# Patient Record
Sex: MALE | Race: WHITE | NOT HISPANIC OR LATINO | Employment: OTHER | URBAN - METROPOLITAN AREA
[De-identification: names, ages, dates, MRNs, and addresses within clinical notes are randomized per-mention and may not be internally consistent; named-entity substitution may affect disease eponyms.]

---

## 2022-10-28 ENCOUNTER — HOSPITAL ENCOUNTER (OUTPATIENT)
Facility: MEDICAL CENTER | Age: 77
End: 2022-10-28
Attending: NURSE PRACTITIONER
Payer: COMMERCIAL

## 2022-10-28 ENCOUNTER — OFFICE VISIT (OUTPATIENT)
Dept: URGENT CARE | Facility: CLINIC | Age: 77
End: 2022-10-28
Payer: COMMERCIAL

## 2022-10-28 VITALS
SYSTOLIC BLOOD PRESSURE: 108 MMHG | RESPIRATION RATE: 16 BRPM | HEIGHT: 71 IN | OXYGEN SATURATION: 94 % | WEIGHT: 237 LBS | DIASTOLIC BLOOD PRESSURE: 60 MMHG | TEMPERATURE: 98.4 F | BODY MASS INDEX: 33.18 KG/M2 | HEART RATE: 80 BPM

## 2022-10-28 DIAGNOSIS — Z79.01 CHRONIC ANTICOAGULATION: ICD-10-CM

## 2022-10-28 DIAGNOSIS — L02.415 CELLULITIS AND ABSCESS OF RIGHT LEG: ICD-10-CM

## 2022-10-28 DIAGNOSIS — R60.0 EDEMA LEG: ICD-10-CM

## 2022-10-28 DIAGNOSIS — Z86.14 HISTORY OF METHICILLIN RESISTANT STAPHYLOCOCCUS AUREUS (MRSA): ICD-10-CM

## 2022-10-28 DIAGNOSIS — L03.115 CELLULITIS AND ABSCESS OF RIGHT LEG: ICD-10-CM

## 2022-10-28 PROCEDURE — 99214 OFFICE O/P EST MOD 30 MIN: CPT | Performed by: NURSE PRACTITIONER

## 2022-10-28 RX ORDER — CEPHALEXIN 500 MG/1
500 CAPSULE ORAL 4 TIMES DAILY
Qty: 28 CAPSULE | Refills: 0 | Status: SHIPPED | OUTPATIENT
Start: 2022-10-28 | End: 2022-11-04

## 2022-10-28 RX ORDER — SULFAMETHOXAZOLE AND TRIMETHOPRIM 800; 160 MG/1; MG/1
1 TABLET ORAL 2 TIMES DAILY
Qty: 14 TABLET | Refills: 0 | Status: SHIPPED | OUTPATIENT
Start: 2022-10-28 | End: 2022-11-04

## 2022-10-28 NOTE — PROGRESS NOTES
Subjective:   Grupo Bowen is a 77 y.o. male who presents for Leg Pain (R calf pain, rash, redness, swelling x 10 days )       HPI  Patient presents for evaluation of an approximate 10-day history of right posterior calf pain, redness, swelling, and drainage.  Patient states his symptoms began worse over the past 2 to 3 days.  Per chart review, he was seen in the ED on 9/22 for same issue.  He was given Keflex and Bactrim at that time, patient states that his wound mostly healed.  Patient is unsure how wound initially occurred.  Of note, he takes Eliquis daily for atrial fibrillation.    ROS  All other systems are negative except as documented above within HPI.    MEDS:   Current Outpatient Medications:   •  anastrozole (ARIMIDEX) 1 MG Tab, Take 1 mg by mouth., Disp: , Rfl:   •  mupirocin (BACTROBAN) 2 % Ointment, Apply 1 Application topically 2 times a day., Disp: 22 g, Rfl: 0  •  apixaban (ELIQUIS) 5mg Tab, Take 5 mg by mouth 2 times a day., Disp: , Rfl:   •  lisinopril (PRINIVIL) 20 MG Tab, Take 20 mg by mouth every day., Disp: , Rfl:   •  simvastatin (ZOCOR) 40 MG Tab, Take 40 mg by mouth every evening., Disp: , Rfl:   •  atenolol (TENORMIN) 50 MG Tab, Take 50 mg by mouth every day., Disp: , Rfl:   •  hydroCHLOROthiazide (HYDRODIURIL) 25 MG Tab, Take 25 mg by mouth every day., Disp: , Rfl:   •  allopurinol (ZYLOPRIM) 300 MG Tab, Take 300 mg by mouth every day., Disp: , Rfl:   •  traZODone (DESYREL) 50 MG Tab, Take 50 mg by mouth every evening., Disp: , Rfl:   •  potassium chloride (KLOR-CON) 20 MEQ Pack, Take 20 mEq by mouth 2 times a day., Disp: , Rfl:   •  nitroglycerin (NITRODUR) 0.1 MG/HR PATCH 24 HR, Place 1 Patch on the skin every day. (Patient not taking: Reported on 10/28/2022), Disp: , Rfl:   ALLERGIES: No Known Allergies    Patient's PMH, SocHx, SurgHx, FamHx, Drug allergies and medications were reviewed.     Objective:   /60 (BP Location: Left arm, Patient Position: Sitting, BP Cuff Size:  "Adult)   Pulse 80   Temp 36.9 °C (98.4 °F) (Temporal)   Resp 16   Ht 1.803 m (5' 11\")   Wt 108 kg (237 lb)   SpO2 94%   BMI 33.05 kg/m²     Physical Exam  Vitals and nursing note reviewed.   Constitutional:       General: He is awake.      Appearance: Normal appearance. He is well-developed.   HENT:      Head: Normocephalic and atraumatic.      Right Ear: External ear normal.      Left Ear: External ear normal.      Nose: Nose normal.      Mouth/Throat:      Lips: Pink.      Mouth: Mucous membranes are moist.      Pharynx: Oropharynx is clear.   Eyes:      General: Lids are normal.      Extraocular Movements: Extraocular movements intact.      Conjunctiva/sclera: Conjunctivae normal.   Cardiovascular:      Rate and Rhythm: Normal rate and regular rhythm.   Pulmonary:      Effort: Pulmonary effort is normal.   Musculoskeletal:         General: Normal range of motion.      Cervical back: Normal range of motion.   Skin:     General: Skin is warm and dry.             Comments: 5x4cm wound to right posterior calf 3+ nonpitting edema with skin sloughing, serosanguineous drainage, and surrounding dry, flaky skin.  Bilateral lower extremities erythematic, and firm, consistent with chronic venous insufficiency.   Neurological:      Mental Status: He is alert and oriented to person, place, and time.   Psychiatric:         Mood and Affect: Mood normal.         Behavior: Behavior normal. Behavior is cooperative.         Thought Content: Thought content normal.         Judgment: Judgment normal.       Assessment/Plan:   Assessment    1. Cellulitis and abscess of right leg  - sulfamethoxazole-trimethoprim (BACTRIM DS) 800-160 MG tablet; Take 1 Tablet by mouth 2 times a day for 7 days.  Dispense: 14 Tablet; Refill: 0  - cephALEXin (KEFLEX) 500 MG Cap; Take 1 Capsule by mouth 4 times a day for 7 days.  Dispense: 28 Capsule; Refill: 0  - CULTURE WOUND W/ GRAM STAIN; Future  - CBC WITH DIFFERENTIAL; Future  - Comp Metabolic " Panel; Future    2. History of methicillin resistant staphylococcus aureus (MRSA)  - sulfamethoxazole-trimethoprim (BACTRIM DS) 800-160 MG tablet; Take 1 Tablet by mouth 2 times a day for 7 days.  Dispense: 14 Tablet; Refill: 0  - cephALEXin (KEFLEX) 500 MG Cap; Take 1 Capsule by mouth 4 times a day for 7 days.  Dispense: 28 Capsule; Refill: 0  - CULTURE WOUND W/ GRAM STAIN; Future  - CBC WITH DIFFERENTIAL; Future  - Comp Metabolic Panel; Future    3. Chronic anticoagulation  - CBC WITH DIFFERENTIAL; Future  - Comp Metabolic Panel; Future    4. Edema leg  - CULTURE WOUND W/ GRAM STAIN; Future  - CBC WITH DIFFERENTIAL; Future  - Comp Metabolic Panel; Future  - proBrain Natriuretic Peptide, NT; Future      Vital signs stable at today's acute urgent care visit.  Wound thoroughly cleaned and redressed.  Wound care discussed at length with patient.  Begin medications as listed.  Additionally, referral to wound care.  Labs as listed, patient will complete today after urgent care visit.      Advised the patient to follow-up with the primary care provider/urgent care if symptoms persist.  Red flags discussed and indications to immediately call 911 or present to the ED. All questions were encouraged and answered to the patient's satisfaction and understanding, and they agree to the plan of care.     This is an acute problem with uncertain prognosis, medication management and instructions as well as management options were provided.  I personally reviewed prior external notes and test results pertinent to today and independently reviewed and interpreted all diagnostics. Time spent evaluating this patient includes preparing for visit, counseling/education, exam, evaluation, obtaining history, and ordering lab/test/procedures.      Please note that this dictation was created using voice recognition software. I have made a reasonable attempt to correct obvious errors, but I expect that there are errors of grammar and possibly  content that I did not discover before finalizing the note.

## 2022-10-29 ENCOUNTER — TELEPHONE (OUTPATIENT)
Dept: URGENT CARE | Facility: CLINIC | Age: 77
End: 2022-10-29
Payer: COMMERCIAL

## 2022-10-29 DIAGNOSIS — L02.415 CELLULITIS AND ABSCESS OF RIGHT LEG: ICD-10-CM

## 2022-10-29 DIAGNOSIS — Z86.14 HISTORY OF METHICILLIN RESISTANT STAPHYLOCOCCUS AUREUS (MRSA): ICD-10-CM

## 2022-10-29 DIAGNOSIS — L03.115 CELLULITIS AND ABSCESS OF RIGHT LEG: ICD-10-CM

## 2022-10-29 DIAGNOSIS — R60.0 EDEMA LEG: ICD-10-CM

## 2022-10-29 NOTE — TELEPHONE ENCOUNTER
Lab called in regards to a recollect of wound culture.  never showed 10/29/22. Specimen valid for 24 hours. Attempted to call pt, no answer. Will attempt 10/30/22.

## 2022-10-31 ENCOUNTER — OFFICE VISIT (OUTPATIENT)
Dept: URGENT CARE | Facility: CLINIC | Age: 77
End: 2022-10-31
Payer: COMMERCIAL

## 2022-10-31 VITALS
RESPIRATION RATE: 16 BRPM | DIASTOLIC BLOOD PRESSURE: 84 MMHG | BODY MASS INDEX: 34.97 KG/M2 | HEIGHT: 71 IN | SYSTOLIC BLOOD PRESSURE: 122 MMHG | TEMPERATURE: 98.6 F | OXYGEN SATURATION: 98 % | WEIGHT: 249.8 LBS | HEART RATE: 95 BPM

## 2022-10-31 DIAGNOSIS — L03.115 CELLULITIS AND ABSCESS OF RIGHT LEG: ICD-10-CM

## 2022-10-31 DIAGNOSIS — Z86.14 HISTORY OF METHICILLIN RESISTANT STAPHYLOCOCCUS AUREUS (MRSA): ICD-10-CM

## 2022-10-31 DIAGNOSIS — Z79.01 CHRONIC ANTICOAGULATION: ICD-10-CM

## 2022-10-31 DIAGNOSIS — N50.89 TESTICULAR SWELLING: ICD-10-CM

## 2022-10-31 DIAGNOSIS — R60.0 EDEMA LEG: ICD-10-CM

## 2022-10-31 DIAGNOSIS — L02.415 CELLULITIS AND ABSCESS OF RIGHT LEG: ICD-10-CM

## 2022-10-31 PROCEDURE — 99214 OFFICE O/P EST MOD 30 MIN: CPT | Performed by: NURSE PRACTITIONER

## 2022-10-31 NOTE — PROGRESS NOTES
Subjective:   Grupo Bowen is a 77 y.o. male who presents for Possible Hernia (Testicle swelling)       HPI  Patient presents for evaluation of an approximate 1 week history of testicular swelling.  Patient states he had similar symptoms in August, which had improved on their own.  Patient is concerned due to swelling severe enough that he needed to push his penis out to urinate.    Additionally, patient is here for wound check involving his right posterior calf.  Patient was seen by myself on 10/28, prescribed Bactrim.  Wound culture was attempted, however was not received by lab and appropriate timing, therefore canceled.  He has a history of MRSA.  Patient has been taking Bactrim as prescribed as well as dressing changes, however is concerned due to worsening swelling and drainage.  Denies fever or chills.    ROS  All other systems are negative except as documented above within HPI.    MEDS:   Current Outpatient Medications:     sulfamethoxazole-trimethoprim (BACTRIM DS) 800-160 MG tablet, Take 1 Tablet by mouth 2 times a day for 7 days., Disp: 14 Tablet, Rfl: 0    cephALEXin (KEFLEX) 500 MG Cap, Take 1 Capsule by mouth 4 times a day for 7 days., Disp: 28 Capsule, Rfl: 0    anastrozole (ARIMIDEX) 1 MG Tab, Take 1 mg by mouth., Disp: , Rfl:     mupirocin (BACTROBAN) 2 % Ointment, Apply 1 Application topically 2 times a day., Disp: 22 g, Rfl: 0    apixaban (ELIQUIS) 5mg Tab, Take 5 mg by mouth 2 times a day., Disp: , Rfl:     lisinopril (PRINIVIL) 20 MG Tab, Take 20 mg by mouth every day., Disp: , Rfl:     simvastatin (ZOCOR) 40 MG Tab, Take 40 mg by mouth every evening., Disp: , Rfl:     atenolol (TENORMIN) 50 MG Tab, Take 50 mg by mouth every day., Disp: , Rfl:     hydroCHLOROthiazide (HYDRODIURIL) 25 MG Tab, Take 25 mg by mouth every day., Disp: , Rfl:     allopurinol (ZYLOPRIM) 300 MG Tab, Take 300 mg by mouth every day., Disp: , Rfl:     traZODone (DESYREL) 50 MG Tab, Take 50 mg by mouth every evening.,  "Disp: , Rfl:     potassium chloride (KLOR-CON) 20 MEQ Pack, Take 20 mEq by mouth 2 times a day., Disp: , Rfl:     nitroglycerin (NITRODUR) 0.1 MG/HR PATCH 24 HR, Place 1 Patch on the skin every day. (Patient not taking: No sig reported), Disp: , Rfl:   ALLERGIES: No Known Allergies    Patient's PMH, SocHx, SurgHx, FamHx, Drug allergies and medications were reviewed.     Objective:   /84   Pulse 95   Temp 37 °C (98.6 °F) (Temporal)   Resp 16   Ht 1.803 m (5' 11\")   Wt 113 kg (249 lb 12.8 oz)   SpO2 98%   BMI 34.84 kg/m²     Physical Exam  Vitals and nursing note reviewed.   Constitutional:       General: He is awake.      Appearance: Normal appearance. He is well-developed.   HENT:      Head: Normocephalic and atraumatic.      Right Ear: External ear normal.      Left Ear: External ear normal.      Nose: Nose normal.      Mouth/Throat:      Lips: Pink.      Mouth: Mucous membranes are moist.      Pharynx: Oropharynx is clear.   Eyes:      General: Lids are normal.      Extraocular Movements: Extraocular movements intact.      Conjunctiva/sclera: Conjunctivae normal.   Cardiovascular:      Rate and Rhythm: Normal rate and regular rhythm.   Pulmonary:      Effort: Pulmonary effort is normal.   Genitourinary:     Testes:         Right: Swelling present.         Left: Swelling present.      Comments: Significant testicular swelling, penis is inverted secondary to swelling.  Musculoskeletal:         General: Normal range of motion.      Cervical back: Normal range of motion.   Skin:     General: Skin is warm and dry.      Findings: Abscess, ecchymosis, erythema and wound present.             Comments: Significant worsening of wound compared to visit with me 3 days ago, necrotic tissue at center of wound with bleeding, purulent drainage present; surrounding eschar/ flaky skin with yellow/white tissue maceration; worsening leg edema and firmness to palpation   Neurological:      Mental Status: He is alert and " oriented to person, place, and time.   Psychiatric:         Mood and Affect: Mood normal.         Behavior: Behavior normal. Behavior is cooperative.         Thought Content: Thought content normal.         Judgment: Judgment normal.       Assessment/Plan:   Assessment    1. Cellulitis and abscess of right leg    2. History of methicillin resistant staphylococcus aureus (MRSA)    3. Chronic anticoagulation    4. Edema leg    5. Testicular swelling      Vital signs stable at today's acute urgent care visit.  Wound evaluated as above, is significantly worse than his visit with me 3 days ago.  Due to history of MRSA and failing outpatient antibiotics, patient referred to ED for evaluation of care.  In addition, advised patient to advise ED staff of testicular swelling while there.    All questions were encouraged and answered to the patient's satisfaction and understanding, and they agree to the plan of care.     This is an acute problem with uncertain prognosis, medication management and instructions as well as management options were provided.  I personally reviewed prior external notes and test results pertinent to today and independently reviewed and interpreted all diagnostics. Time spent evaluating this patient includes preparing for visit, counseling/education, exam, evaluation, obtaining history, and ordering lab/test/procedures.      Please note that this dictation was created using voice recognition software. I have made a reasonable attempt to correct obvious errors, but I expect that there are errors of grammar and possibly content that I did not discover before finalizing the note.

## 2024-02-06 ENCOUNTER — OFFICE VISIT (OUTPATIENT)
Dept: URGENT CARE | Facility: CLINIC | Age: 79
End: 2024-02-06
Payer: COMMERCIAL

## 2024-02-06 VITALS
OXYGEN SATURATION: 92 % | DIASTOLIC BLOOD PRESSURE: 72 MMHG | WEIGHT: 219 LBS | TEMPERATURE: 97.6 F | BODY MASS INDEX: 29.66 KG/M2 | HEART RATE: 83 BPM | SYSTOLIC BLOOD PRESSURE: 130 MMHG | HEIGHT: 72 IN | RESPIRATION RATE: 12 BRPM

## 2024-02-06 DIAGNOSIS — J22 LRTI (LOWER RESPIRATORY TRACT INFECTION): ICD-10-CM

## 2024-02-06 PROCEDURE — 99213 OFFICE O/P EST LOW 20 MIN: CPT | Performed by: PHYSICIAN ASSISTANT

## 2024-02-06 PROCEDURE — 3078F DIAST BP <80 MM HG: CPT | Performed by: PHYSICIAN ASSISTANT

## 2024-02-06 PROCEDURE — 3075F SYST BP GE 130 - 139MM HG: CPT | Performed by: PHYSICIAN ASSISTANT

## 2024-02-06 RX ORDER — PREDNISONE 20 MG/1
TABLET ORAL
Qty: 10 TABLET | Refills: 0 | Status: SHIPPED | OUTPATIENT
Start: 2024-02-06

## 2024-02-06 RX ORDER — BENZONATATE 200 MG/1
200 CAPSULE ORAL 3 TIMES DAILY PRN
Qty: 30 CAPSULE | Refills: 0 | Status: SHIPPED | OUTPATIENT
Start: 2024-02-06

## 2024-02-06 RX ORDER — DOXYCYCLINE HYCLATE 100 MG
100 TABLET ORAL 2 TIMES DAILY
Qty: 14 TABLET | Refills: 0 | Status: SHIPPED | OUTPATIENT
Start: 2024-02-06 | End: 2024-02-13

## 2024-02-06 ASSESSMENT — ENCOUNTER SYMPTOMS
MYALGIAS: 0
DIARRHEA: 0
NAUSEA: 0
SPUTUM PRODUCTION: 1
SHORTNESS OF BREATH: 1
ABDOMINAL PAIN: 0
HEADACHES: 0
SINUS PAIN: 0
WHEEZING: 1
SORE THROAT: 0
CHILLS: 0
COUGH: 1
VOMITING: 0
RHINORRHEA: 0
HEMOPTYSIS: 0
FEVER: 0

## 2024-02-06 ASSESSMENT — FIBROSIS 4 INDEX: FIB4 SCORE: 6.78

## 2024-02-06 NOTE — PROGRESS NOTES
Subjective     Grupo Bowen is a 78 y.o. male who presents with Other (Chest congestion x 2 weeks  )            Cough  This is a new problem. Episode onset: 2 weeks. The problem has been unchanged. The cough is Productive of sputum (yellow/white). Associated symptoms include shortness of breath (mild) and wheezing. Pertinent negatives include no chest pain, chills, ear congestion, ear pain, fever, headaches, hemoptysis, myalgias, nasal congestion, postnasal drip, rash, rhinorrhea or sore throat. The symptoms are aggravated by lying down. He has tried a beta-agonist inhaler and OTC cough suppressant for the symptoms. The treatment provided no relief.       Past Medical History:   Diagnosis Date    Atrial fibrillation (HCC)          No past surgical history on file.      No family history on file.      Patient has no known allergies.      Medications, Allergies, and current problem list reviewed today in Epic    Review of Systems   Constitutional:  Positive for malaise/fatigue. Negative for chills and fever.   HENT:  Negative for congestion, ear pain, postnasal drip, rhinorrhea, sinus pain and sore throat.    Respiratory:  Positive for cough, sputum production, shortness of breath (mild) and wheezing. Negative for hemoptysis.    Cardiovascular:  Negative for chest pain and leg swelling.   Gastrointestinal:  Negative for abdominal pain, diarrhea, nausea and vomiting.   Musculoskeletal:  Negative for myalgias.   Skin:  Negative for rash.   Neurological:  Negative for headaches.     All other systems reviewed and are negative.            Objective     /72 (BP Location: Left arm, Patient Position: Sitting, BP Cuff Size: Adult)   Pulse 83   Temp 36.4 °C (97.6 °F) (Temporal)   Resp 12   Ht 1.829 m (6')   Wt 99.3 kg (219 lb)   SpO2 92%   BMI 29.70 kg/m²      Physical Exam  Constitutional:       General: He is not in acute distress.     Appearance: He is not ill-appearing.   HENT:      Head: Normocephalic  and atraumatic.      Mouth/Throat:      Mouth: Mucous membranes are moist.      Pharynx: No posterior oropharyngeal erythema.   Eyes:      Conjunctiva/sclera: Conjunctivae normal.   Cardiovascular:      Rate and Rhythm: Normal rate and regular rhythm.      Heart sounds: Normal heart sounds.   Pulmonary:      Effort: No respiratory distress.      Breath sounds: No stridor. Wheezing (expiratory wheezes) present. No rhonchi or rales.      Comments: Spasmodic deep cough  Skin:     General: Skin is warm and dry.   Neurological:      General: No focal deficit present.      Mental Status: He is alert and oriented to person, place, and time.   Psychiatric:         Mood and Affect: Mood normal.         Behavior: Behavior normal.         Thought Content: Thought content normal.         Judgment: Judgment normal.                             Assessment & Plan        1. LRTI (lower respiratory tract infection)      - doxycycline (VIBRAMYCIN) 100 MG Tab; Take 1 Tablet by mouth 2 times a day for 7 days.  Dispense: 14 Tablet; Refill: 0  - predniSONE (DELTASONE) 20 MG Tab; 2 tabs po daily x 5 day.  Dispense: 10 Tablet; Refill: 0  - benzonatate (TESSALON) 200 MG capsule; Take 1 Capsule by mouth 3 times a day as needed for Cough.  Dispense: 30 Capsule; Refill: 0    Continue albuterol inhaler.    Differential diagnoses, Supportive care, and indications for immediate follow-up discussed with patient.   Pathogenesis of diagnosis discussed including typical length and natural progression.   Instructed to return to clinic or nearest emergency department for any change in condition, further concerns, or worsening of symptoms.      The patient demonstrated a good understanding and agreed with the treatment plan.    Susanna Schmitt P.A.-C.

## 2024-02-07 ENCOUNTER — OFFICE VISIT (OUTPATIENT)
Dept: URGENT CARE | Facility: CLINIC | Age: 79
End: 2024-02-07
Payer: COMMERCIAL

## 2024-02-07 ENCOUNTER — APPOINTMENT (OUTPATIENT)
Dept: RADIOLOGY | Facility: IMAGING CENTER | Age: 79
End: 2024-02-07
Payer: COMMERCIAL

## 2024-02-07 VITALS
HEART RATE: 74 BPM | SYSTOLIC BLOOD PRESSURE: 128 MMHG | BODY MASS INDEX: 29.66 KG/M2 | HEIGHT: 72 IN | RESPIRATION RATE: 16 BRPM | DIASTOLIC BLOOD PRESSURE: 86 MMHG | OXYGEN SATURATION: 94 % | WEIGHT: 219 LBS | TEMPERATURE: 97.8 F

## 2024-02-07 DIAGNOSIS — J40 BRONCHITIS: ICD-10-CM

## 2024-02-07 DIAGNOSIS — I51.7 CARDIOMEGALY: ICD-10-CM

## 2024-02-07 DIAGNOSIS — J90 PLEURAL EFFUSION: ICD-10-CM

## 2024-02-07 DIAGNOSIS — J22 LRTI (LOWER RESPIRATORY TRACT INFECTION): ICD-10-CM

## 2024-02-07 DIAGNOSIS — R06.2 WHEEZING: ICD-10-CM

## 2024-02-07 PROBLEM — K59.09 OTHER CONSTIPATION: Status: ACTIVE | Noted: 2024-02-07

## 2024-02-07 PROBLEM — D50.9 IRON DEFICIENCY ANEMIA, UNSPECIFIED: Status: ACTIVE | Noted: 2024-02-07

## 2024-02-07 PROBLEM — K02.62 DENTAL CARIES ON SMOOTH SURFACE PENETRATING INTO DENTIN: Status: ACTIVE | Noted: 2022-11-04

## 2024-02-07 PROBLEM — K57.30 DIVERTICULOSIS OF COLON: Status: ACTIVE | Noted: 2022-11-04

## 2024-02-07 PROBLEM — Z79.01 LONG TERM (CURRENT) USE OF ANTICOAGULANTS: Status: ACTIVE | Noted: 2024-02-07

## 2024-02-07 PROBLEM — H52.4 PRESBYOPIA: Status: ACTIVE | Noted: 2024-02-07

## 2024-02-07 PROBLEM — I25.9 CHRONIC ISCHEMIC HEART DISEASE: Status: ACTIVE | Noted: 2022-11-04

## 2024-02-07 PROBLEM — B35.1 TINEA UNGUIUM: Status: ACTIVE | Noted: 2024-02-07

## 2024-02-07 PROBLEM — N52.9 MALE ERECTILE DYSFUNCTION, UNSPECIFIED: Status: ACTIVE | Noted: 2024-02-07

## 2024-02-07 PROBLEM — F33.1 MODERATE EPISODE OF RECURRENT MAJOR DEPRESSIVE DISORDER (HCC): Status: ACTIVE | Noted: 2024-02-07

## 2024-02-07 PROBLEM — C44.622 SQUAMOUS CELL CARCINOMA OF SKIN OF RIGHT UPPER LIMB, INCLUDING SHOULDER: Status: ACTIVE | Noted: 2024-02-07

## 2024-02-07 PROBLEM — K64.8 INTERNAL HEMORRHOIDS WITHOUT COMPLICATION: Status: ACTIVE | Noted: 2024-02-07

## 2024-02-07 PROBLEM — K62.5 HEMORRHAGE OF ANUS AND RECTUM: Status: ACTIVE | Noted: 2024-02-07

## 2024-02-07 PROBLEM — E78.5 HYPERLIPIDEMIA: Status: ACTIVE | Noted: 2022-11-04

## 2024-02-07 PROBLEM — D64.9 ANEMIA: Status: ACTIVE | Noted: 2024-02-07

## 2024-02-07 PROBLEM — K92.2 GASTROINTESTINAL HEMORRHAGE, UNSPECIFIED: Status: ACTIVE | Noted: 2024-02-07

## 2024-02-07 PROBLEM — R04.0 EPISTAXIS: Status: ACTIVE | Noted: 2022-11-04

## 2024-02-07 PROBLEM — E66.9 OBESITY: Status: ACTIVE | Noted: 2024-02-07

## 2024-02-07 PROBLEM — K08.434 PARTIAL LOSS OF TEETH DUE TO CARIES, CLASS IV: Status: ACTIVE | Noted: 2024-02-07

## 2024-02-07 PROBLEM — D75.89 OTHER SPECIFIED DISEASES OF BLOOD AND BLOOD-FORMING ORGANS: Status: ACTIVE | Noted: 2024-02-07

## 2024-02-07 PROBLEM — I48.91 ATRIAL FIBRILLATION (HCC): Status: ACTIVE | Noted: 2024-02-07

## 2024-02-07 PROBLEM — R22.1 MASS OF NECK: Status: ACTIVE | Noted: 2024-02-07

## 2024-02-07 PROBLEM — D48.5 NEOPLASM OF UNCERTAIN BEHAVIOR OF SKIN: Status: ACTIVE | Noted: 2024-02-07

## 2024-02-07 PROBLEM — I25.10 ATHEROSCLEROTIC HEART DISEASE OF NATIVE CORONARY ARTERY WITHOUT ANGINA PECTORIS: Status: ACTIVE | Noted: 2024-02-07

## 2024-02-07 PROBLEM — H40.013 OPEN ANGLE WITH BORDERLINE FINDINGS, LOW RISK, BILATERAL: Status: ACTIVE | Noted: 2024-02-07

## 2024-02-07 PROBLEM — H02.831 DERMATOCHALASIS OF RIGHT UPPER EYELID: Status: ACTIVE | Noted: 2022-11-04

## 2024-02-07 PROBLEM — M10.9 GOUT: Status: ACTIVE | Noted: 2024-02-07

## 2024-02-07 PROBLEM — F43.10 POSTTRAUMATIC STRESS DISORDER: Status: ACTIVE | Noted: 2024-02-07

## 2024-02-07 PROBLEM — C44.212 BASAL CELL CARCINOMA OF SKIN OF RIGHT EAR AND EXTERNAL AURICULAR CANAL: Status: ACTIVE | Noted: 2024-02-07

## 2024-02-07 PROBLEM — N52.01 ERECTILE DYSFUNCTION DUE TO ARTERIAL INSUFFICIENCY: Status: ACTIVE | Noted: 2023-08-22

## 2024-02-07 PROBLEM — I10 ESSENTIAL (PRIMARY) HYPERTENSION: Status: ACTIVE | Noted: 2024-02-07

## 2024-02-07 PROBLEM — F10.10 NONDEPENDENT ALCOHOL ABUSE: Status: ACTIVE | Noted: 2024-02-07

## 2024-02-07 PROBLEM — H26.9 CATARACT: Status: ACTIVE | Noted: 2022-11-04

## 2024-02-07 PROCEDURE — 3074F SYST BP LT 130 MM HG: CPT

## 2024-02-07 PROCEDURE — 71046 X-RAY EXAM CHEST 2 VIEWS: CPT | Mod: TC

## 2024-02-07 PROCEDURE — 3079F DIAST BP 80-89 MM HG: CPT

## 2024-02-07 PROCEDURE — 99214 OFFICE O/P EST MOD 30 MIN: CPT

## 2024-02-07 ASSESSMENT — FIBROSIS 4 INDEX: FIB4 SCORE: 6.78

## 2024-02-07 NOTE — PROGRESS NOTES
Subjective:   Grupo Bowen is a 78 y.o. male who presents for Cough (Pt states was seen yesterday, taking medication prescribed, requesting xray )      HPI:    Patient returns urgent care requesting chest x-ray.  States he was seen yesterday and diagnosed with lower respiratory tract infection.  He started the antibiotics and has been using the inhaler.  He states he would like to have a chest x-ray.  He states that his symptoms worsened overnight and he has very minimal sleep due to recurrence of cough.  He states his cough is productive yellow/white sputum.  He endorses chest tightness and wheezing.  Symptoms are significantly aggravated when he lays down.  He states he had to use an incline pillow to try to sleep and was unable to get adequate amount of sleep in the last 3 days.  States his significant other at home would like him to have a chest x-ray.  He denies known structural lung disease.  Denies history of asthma, COPD, smoking.  Denies fever but he has been sweating.  Denies unintentional weight loss      ROS As above in HPI    Medications:    Current Outpatient Medications on File Prior to Visit   Medication Sig Dispense Refill    doxycycline (VIBRAMYCIN) 100 MG Tab Take 1 Tablet by mouth 2 times a day for 7 days. 14 Tablet 0    predniSONE (DELTASONE) 20 MG Tab 2 tabs po daily x 5 day. 10 Tablet 0    benzonatate (TESSALON) 200 MG capsule Take 1 Capsule by mouth 3 times a day as needed for Cough. 30 Capsule 0    anastrozole (ARIMIDEX) 1 MG Tab Take 1 mg by mouth.      mupirocin (BACTROBAN) 2 % Ointment Apply 1 Application topically 2 times a day. 22 g 0    apixaban (ELIQUIS) 5mg Tab Take 5 mg by mouth 2 times a day.      lisinopril (PRINIVIL) 20 MG Tab Take 20 mg by mouth every day.      simvastatin (ZOCOR) 40 MG Tab Take 40 mg by mouth every evening.      atenolol (TENORMIN) 50 MG Tab Take 50 mg by mouth every day.      hydroCHLOROthiazide (HYDRODIURIL) 25 MG Tab Take 25 mg by mouth every day.       allopurinol (ZYLOPRIM) 300 MG Tab Take 300 mg by mouth every day.      traZODone (DESYREL) 50 MG Tab Take 50 mg by mouth every evening.      potassium chloride (KLOR-CON) 20 MEQ Pack Take 20 mEq by mouth 2 times a day.      nitroglycerin (NITRODUR) 0.1 MG/HR PATCH 24 HR Place 1 Patch on the skin every day. (Patient not taking: Reported on 10/28/2022)       No current facility-administered medications on file prior to visit.        Allergies:   Patient has no known allergies.    Problem List:   There is no problem list on file for this patient.       Surgical History:  No past surgical history on file.    Past Social Hx:   Social History     Tobacco Use    Smoking status: Never    Smokeless tobacco: Never          Problem list, medications, and allergies reviewed by myself today in Epic.     Objective:     /86 (BP Location: Left arm, Patient Position: Sitting, BP Cuff Size: Adult)   Pulse 74   Temp 36.6 °C (97.8 °F) (Temporal)   Resp 16   Ht 1.829 m (6')   Wt 99.3 kg (219 lb)   SpO2 94%   BMI 29.70 kg/m²     Physical Exam  Vitals and nursing note reviewed.   Constitutional:       General: He is not in acute distress.     Appearance: Normal appearance. He is not ill-appearing or diaphoretic.   HENT:      Head: Normocephalic.      Right Ear: Tympanic membrane and ear canal normal.      Left Ear: Tympanic membrane and ear canal normal.      Nose: Congestion and rhinorrhea present.      Mouth/Throat:      Mouth: Mucous membranes are moist.      Pharynx: Oropharynx is clear. Posterior oropharyngeal erythema present.   Cardiovascular:      Rate and Rhythm: Normal rate and regular rhythm.      Heart sounds: Normal heart sounds. No murmur heard.     No friction rub. No gallop.   Pulmonary:      Effort: Pulmonary effort is normal. No respiratory distress.      Breath sounds: No stridor. Examination of the right-upper field reveals wheezing. Examination of the left-upper field reveals wheezing. Examination of the  right-middle field reveals decreased breath sounds and wheezing. Examination of the left-middle field reveals decreased breath sounds and wheezing. Examination of the right-lower field reveals decreased breath sounds and wheezing. Examination of the left-lower field reveals decreased breath sounds and wheezing. Decreased breath sounds and wheezing present. No rhonchi or rales.   Chest:      Chest wall: No tenderness.   Abdominal:      General: Abdomen is flat. Bowel sounds are normal.      Palpations: Abdomen is soft.   Skin:     General: Skin is warm and dry.      Capillary Refill: Capillary refill takes less than 2 seconds.      Findings: No rash.   Neurological:      Mental Status: He is alert and oriented to person, place, and time.       Assessment/Plan:     DX-CHEST-2 VIEWS 02/07/2024    Narrative  2/7/2024 10:59 AM    HISTORY/REASON FOR EXAM:  Cough; r/o pneumonia      TECHNIQUE/EXAM DESCRIPTION AND NUMBER OF VIEWS:  Two views of the chest.    COMPARISON:  None.    FINDINGS:    Hazy right basilar opacity.  Layering mild-to-moderate right pleural effusion. No pneumothorax.  Enlarged cardiopericardial silhouette.    Impression  Cardiomegaly.    Layering mild to moderate pleural effusion.    Patchy right basilar opacities, atelectasis or infection.        Diagnosis and associated orders:   1. Wheezing  - DX-CHEST-2 VIEWS; Future    2. Pleural effusion    3. Cardiomegaly    4. LRTI (lower respiratory tract infection)        Comments/MDM:     Per radiology impression, there is:   Cardiomegaly.    Layering mild to moderate pleural effusion.    Patchy right basilar opacities, atelectasis or infection.    Patient was started on doxycycline yesterday in urgent care. Patient reports he has started the antibiotics and has not encounter any problems. He is currently taking lasix, but does not recall the medication dose. He has pmh of ischemic heart disease. He is going to call his PCP to inform him of worsening pleural  effusion. States his lasix dose was recently cut in half. His vital signs are currently stable, however, concerned he may not get adequate diuresis with oral lasix to alleviate him of worsening SOB. Advised patient to seek care in the ER for management of moderate pleural effusion if he does not have success with communication with his PCP/Cardiologist today or if he develops worsening breathing issues despite antibiotic use. Patient verablized understanding and consented to plan of care.     Please note that this dictation was created using voice recognition software. I have made a reasonable attempt to correct obvious errors, but I expect that there are errors of grammar and possibly content that I did not discover before finalizing the note.    This note was electronically signed by MARKEL Mcelroy

## 2024-06-17 ENCOUNTER — APPOINTMENT (OUTPATIENT)
Dept: RADIOLOGY | Facility: IMAGING CENTER | Age: 79
End: 2024-06-17
Attending: FAMILY MEDICINE
Payer: COMMERCIAL

## 2024-06-17 ENCOUNTER — OFFICE VISIT (OUTPATIENT)
Dept: URGENT CARE | Facility: CLINIC | Age: 79
End: 2024-06-17
Payer: COMMERCIAL

## 2024-06-17 VITALS
RESPIRATION RATE: 18 BRPM | OXYGEN SATURATION: 96 % | DIASTOLIC BLOOD PRESSURE: 60 MMHG | SYSTOLIC BLOOD PRESSURE: 118 MMHG | TEMPERATURE: 98.3 F | HEART RATE: 82 BPM | HEIGHT: 72 IN | BODY MASS INDEX: 28.58 KG/M2 | WEIGHT: 211 LBS

## 2024-06-17 DIAGNOSIS — R05.1 ACUTE COUGH: ICD-10-CM

## 2024-06-17 DIAGNOSIS — U07.1 COVID: ICD-10-CM

## 2024-06-17 LAB
FLUAV RNA SPEC QL NAA+PROBE: NEGATIVE
FLUBV RNA SPEC QL NAA+PROBE: NEGATIVE
RSV RNA SPEC QL NAA+PROBE: NEGATIVE
SARS-COV-2 RNA RESP QL NAA+PROBE: POSITIVE

## 2024-06-17 PROCEDURE — 71046 X-RAY EXAM CHEST 2 VIEWS: CPT | Mod: TC | Performed by: RADIOLOGY

## 2024-06-17 PROCEDURE — 99214 OFFICE O/P EST MOD 30 MIN: CPT | Performed by: FAMILY MEDICINE

## 2024-06-17 PROCEDURE — 0241U POCT CEPHEID COV-2, FLU A/B, RSV - PCR: CPT | Performed by: FAMILY MEDICINE

## 2024-06-17 PROCEDURE — 3074F SYST BP LT 130 MM HG: CPT | Performed by: FAMILY MEDICINE

## 2024-06-17 PROCEDURE — 3078F DIAST BP <80 MM HG: CPT | Performed by: FAMILY MEDICINE

## 2024-06-17 RX ORDER — DEXTROMETHORPHAN HYDROBROMIDE AND PROMETHAZINE HYDROCHLORIDE 15; 6.25 MG/5ML; MG/5ML
5 SYRUP ORAL EVERY 4 HOURS PRN
Qty: 120 ML | Refills: 0 | Status: SHIPPED | OUTPATIENT
Start: 2024-06-17 | End: 2024-06-17

## 2024-06-17 RX ORDER — DEXTROMETHORPHAN HYDROBROMIDE AND PROMETHAZINE HYDROCHLORIDE 15; 6.25 MG/5ML; MG/5ML
5 SYRUP ORAL EVERY 4 HOURS PRN
Qty: 120 ML | Refills: 0 | Status: SHIPPED | OUTPATIENT
Start: 2024-06-17

## 2024-06-17 ASSESSMENT — ENCOUNTER SYMPTOMS
GASTROINTESTINAL NEGATIVE: 1
COUGH: 1
CARDIOVASCULAR NEGATIVE: 1
MUSCULOSKELETAL NEGATIVE: 1
CONSTITUTIONAL NEGATIVE: 1
EYES NEGATIVE: 1

## 2024-06-17 ASSESSMENT — FIBROSIS 4 INDEX: FIB4 SCORE: 5.35

## 2024-06-17 NOTE — PROGRESS NOTES
Subjective:   Grupo Bowen is a 78 y.o. male who presents for Nasal Congestion, URI, and Cough (X 3 days)      Does use an inhaler    URI   Associated symptoms include coughing.   Cough        Review of Systems   Constitutional: Negative.    HENT: Negative.     Eyes: Negative.    Respiratory:  Positive for cough.    Cardiovascular: Negative.    Gastrointestinal: Negative.    Genitourinary: Negative.    Musculoskeletal: Negative.    Skin: Negative.        Medications, Allergies, and current problem list reviewed today in Epic.     Objective:     /60   Pulse 82   Temp 36.8 °C (98.3 °F) (Temporal)   Resp 18   Ht 1.829 m (6')   Wt 95.7 kg (211 lb)   SpO2 96%     Physical Exam  Vitals and nursing note reviewed.   HENT:      Head: Normocephalic and atraumatic.      Right Ear: Tympanic membrane normal.      Left Ear: Tympanic membrane normal.      Nose: Nose normal.      Mouth/Throat:      Pharynx: Oropharynx is clear.   Eyes:      Pupils: Pupils are equal, round, and reactive to light.   Cardiovascular:      Rate and Rhythm: Normal rate and regular rhythm.      Pulses: Normal pulses.      Heart sounds: Normal heart sounds.   Pulmonary:      Effort: Pulmonary effort is normal.      Breath sounds: Normal breath sounds.   Abdominal:      General: Abdomen is flat. Bowel sounds are normal.      Palpations: Abdomen is soft.   Musculoskeletal:      Cervical back: Normal range of motion and neck supple.         Assessment/Plan:     Diagnosis and associated orders:     1. Acute cough  DX-CHEST-2 VIEWS    POCT CEPHEID COV-2, FLU A/B, RSV - PCR    promethazine-dextromethorphan (PROMETHAZINE-DM) 6.25-15 MG/5ML syrup      2. COVID           Comments/MDM:     Persistent pleural effusion  Atelectasis  No new infiltrates or consolidations           Differential diagnosis, natural history, supportive care, and indications for immediate follow-up discussed.    Advised the patient to follow-up with the primary care  physician for recheck, reevaluation, and consideration of further management.    Please note that this dictation was created using voice recognition software. I have made a reasonable attempt to correct obvious errors, but I expect that there are errors of grammar and possibly content that I did not discover before finalizing the note.    This note was electronically signed by Jose F Hoyos M.D.

## 2024-10-21 ENCOUNTER — OFFICE VISIT (OUTPATIENT)
Dept: URGENT CARE | Facility: CLINIC | Age: 79
End: 2024-10-21
Payer: COMMERCIAL

## 2024-10-21 ENCOUNTER — HOSPITAL ENCOUNTER (OUTPATIENT)
Facility: MEDICAL CENTER | Age: 79
End: 2024-10-21
Attending: FAMILY MEDICINE
Payer: COMMERCIAL

## 2024-10-21 VITALS
SYSTOLIC BLOOD PRESSURE: 122 MMHG | BODY MASS INDEX: 29.54 KG/M2 | HEIGHT: 71 IN | OXYGEN SATURATION: 97 % | RESPIRATION RATE: 14 BRPM | HEART RATE: 64 BPM | WEIGHT: 211 LBS | DIASTOLIC BLOOD PRESSURE: 82 MMHG | TEMPERATURE: 97.4 F

## 2024-10-21 DIAGNOSIS — S90.812A ABRASION OF LEFT FOOT, INITIAL ENCOUNTER: ICD-10-CM

## 2024-10-21 DIAGNOSIS — Z86.14 HISTORY OF MRSA INFECTION: ICD-10-CM

## 2024-10-21 DIAGNOSIS — L85.9: ICD-10-CM

## 2024-10-21 PROCEDURE — 87070 CULTURE OTHR SPECIMN AEROBIC: CPT

## 2024-10-21 PROCEDURE — 87205 SMEAR GRAM STAIN: CPT

## 2024-10-21 PROCEDURE — 3079F DIAST BP 80-89 MM HG: CPT | Performed by: FAMILY MEDICINE

## 2024-10-21 PROCEDURE — 99214 OFFICE O/P EST MOD 30 MIN: CPT | Performed by: FAMILY MEDICINE

## 2024-10-21 PROCEDURE — 3074F SYST BP LT 130 MM HG: CPT | Performed by: FAMILY MEDICINE

## 2024-10-21 RX ORDER — ALBUTEROL SULFATE 90 UG/1
INHALANT RESPIRATORY (INHALATION)
COMMUNITY
Start: 2024-06-12

## 2024-10-21 RX ORDER — CARVEDILOL 6.25 MG/1
TABLET ORAL
COMMUNITY

## 2024-10-21 RX ORDER — FUROSEMIDE 20 MG/1
TABLET ORAL
COMMUNITY

## 2024-10-21 RX ORDER — SILDENAFIL 100 MG/1
100 TABLET, FILM COATED ORAL
COMMUNITY
Start: 2024-06-12

## 2024-10-21 RX ORDER — PANTOPRAZOLE SODIUM 40 MG/1
20 FOR SUSPENSION ORAL
COMMUNITY

## 2024-10-21 RX ORDER — SIMETHICONE 80 MG
80 TABLET,CHEWABLE ORAL
COMMUNITY
Start: 2024-06-13

## 2024-10-21 RX ORDER — LORATADINE 10 MG/1
10 TABLET ORAL DAILY
COMMUNITY

## 2024-10-21 RX ORDER — MUPIROCIN 20 MG/G
1 OINTMENT TOPICAL 2 TIMES DAILY
Qty: 22 G | Refills: 0 | Status: SHIPPED | OUTPATIENT
Start: 2024-10-21

## 2024-10-21 ASSESSMENT — ENCOUNTER SYMPTOMS
VOMITING: 0
SORE THROAT: 0
MYALGIAS: 0
CHILLS: 0
SHORTNESS OF BREATH: 0
NAUSEA: 0
DIZZINESS: 0
FEVER: 0
COUGH: 0

## 2024-10-21 ASSESSMENT — FIBROSIS 4 INDEX: FIB4 SCORE: 5.42

## 2024-10-22 LAB
GRAM STN SPEC: NORMAL
SIGNIFICANT IND 70042: NORMAL
SITE SITE: NORMAL
SOURCE SOURCE: NORMAL

## 2024-10-24 LAB
BACTERIA WND AEROBE CULT: NORMAL
GRAM STN SPEC: NORMAL
SIGNIFICANT IND 70042: NORMAL
SITE SITE: NORMAL
SOURCE SOURCE: NORMAL

## 2024-12-12 ENCOUNTER — APPOINTMENT (OUTPATIENT)
Dept: RADIOLOGY | Facility: IMAGING CENTER | Age: 79
End: 2024-12-12
Attending: REGISTERED NURSE
Payer: COMMERCIAL

## 2024-12-12 ENCOUNTER — OFFICE VISIT (OUTPATIENT)
Dept: URGENT CARE | Facility: CLINIC | Age: 79
End: 2024-12-12
Payer: COMMERCIAL

## 2024-12-12 VITALS
RESPIRATION RATE: 16 BRPM | OXYGEN SATURATION: 97 % | TEMPERATURE: 98.5 F | SYSTOLIC BLOOD PRESSURE: 126 MMHG | WEIGHT: 215 LBS | BODY MASS INDEX: 30.1 KG/M2 | HEIGHT: 71 IN | DIASTOLIC BLOOD PRESSURE: 78 MMHG | HEART RATE: 78 BPM

## 2024-12-12 DIAGNOSIS — I48.91 ATRIAL FIBRILLATION, UNSPECIFIED TYPE (HCC): ICD-10-CM

## 2024-12-12 DIAGNOSIS — R05.1 ACUTE COUGH: ICD-10-CM

## 2024-12-12 LAB
FLUAV RNA SPEC QL NAA+PROBE: NEGATIVE
FLUBV RNA SPEC QL NAA+PROBE: NEGATIVE
RSV RNA SPEC QL NAA+PROBE: NEGATIVE
SARS-COV-2 RNA RESP QL NAA+PROBE: NEGATIVE

## 2024-12-12 PROCEDURE — 3078F DIAST BP <80 MM HG: CPT | Performed by: REGISTERED NURSE

## 2024-12-12 PROCEDURE — 71046 X-RAY EXAM CHEST 2 VIEWS: CPT | Mod: TC | Performed by: REGISTERED NURSE

## 2024-12-12 PROCEDURE — 3074F SYST BP LT 130 MM HG: CPT | Performed by: REGISTERED NURSE

## 2024-12-12 PROCEDURE — 99214 OFFICE O/P EST MOD 30 MIN: CPT | Performed by: REGISTERED NURSE

## 2024-12-12 PROCEDURE — 0241U POCT CEPHEID COV-2, FLU A/B, RSV - PCR: CPT | Performed by: REGISTERED NURSE

## 2024-12-12 RX ORDER — DOXYCYCLINE HYCLATE 100 MG
100 TABLET ORAL 2 TIMES DAILY
Qty: 14 TABLET | Refills: 0 | Status: SHIPPED | OUTPATIENT
Start: 2024-12-12 | End: 2024-12-19

## 2024-12-12 RX ORDER — SPIRONOLACTONE 25 MG/1
25 TABLET ORAL
COMMUNITY
Start: 2024-11-01

## 2024-12-12 RX ORDER — CARVEDILOL 6.25 MG/1
6.25 TABLET ORAL 2 TIMES DAILY
Qty: 60 TABLET | Refills: 0 | Status: SHIPPED | OUTPATIENT
Start: 2024-12-12 | End: 2025-01-11

## 2024-12-12 ASSESSMENT — ENCOUNTER SYMPTOMS
DIZZINESS: 0
SHORTNESS OF BREATH: 0
FEVER: 0

## 2024-12-12 NOTE — PROGRESS NOTES
Subjective:   Grupo Bowen is a 79 y.o. male who presents for Pneumonia (Patient coming in for cough, runny nose, sore throat, poss pneumonia, requesting x ray ) and Medication Refill (Carvedilol )      HPI  X 2 days Runny nose, sore throat and cough, concerned for pneumonia. Using mucinex for symptoms. No chronic lung disease but has had pneumonia. Does have multiple comorbid conditions and poly pharmacy. Needs refill of carvedilol, for a-fib.    [phone]    Review of Systems   Constitutional:  Negative for fever.   Respiratory:  Negative for shortness of breath.    Cardiovascular:  Negative for chest pain.   Skin:  Negative for rash.   Neurological:  Negative for dizziness.       No Known Allergies    Patient Active Problem List    Diagnosis Date Noted    Anemia 02/07/2024    Atherosclerotic heart disease of native coronary artery without angina pectoris 02/07/2024    Basal cell carcinoma of skin of right ear and external auricular canal 02/07/2024    Gastrointestinal hemorrhage, unspecified 02/07/2024    Gout 02/07/2024    Hemorrhage of anus and rectum 02/07/2024    Essential (primary) hypertension 02/07/2024    Internal hemorrhoids without complication 02/07/2024    Iron deficiency anemia, unspecified 02/07/2024    Long term (current) use of anticoagulants 02/07/2024    Male erectile dysfunction, unspecified 02/07/2024    Mass of neck 02/07/2024    Moderate episode of recurrent major depressive disorder (HCC) 02/07/2024    Neoplasm of uncertain behavior of skin 02/07/2024    Nondependent alcohol abuse 02/07/2024    Obesity 02/07/2024    Open angle with borderline findings, low risk, bilateral 02/07/2024    Other constipation 02/07/2024    Other specified diseases of blood and blood-forming organs 02/07/2024    Atrial fibrillation (HCC) 02/07/2024    Partial loss of teeth due to caries, class IV 02/07/2024    Posttraumatic stress disorder 02/07/2024    Presbyopia 02/07/2024    Squamous cell carcinoma of  skin of right upper limb, including shoulder 02/07/2024    Tinea unguium 02/07/2024    Erectile dysfunction due to arterial insufficiency 08/22/2023    Cataract 11/04/2022    Chronic ischemic heart disease 11/04/2022    Dental caries on smooth surface penetrating into dentin 11/04/2022    Dermatochalasis of right upper eyelid 11/04/2022    Diverticulosis of colon 11/04/2022    Epistaxis 11/04/2022    Hyperlipidemia 11/04/2022       Current Outpatient Medications Ordered in Epic   Medication Sig Dispense Refill    spironolactone (ALDACTONE) 25 MG Tab Take 25 mg by mouth.      Empagliflozin 25 MG Tab Take 12.5 mg by mouth.      doxycycline (VIBRAMYCIN) 100 MG Tab Take 1 Tablet by mouth 2 times a day for 7 days. 14 Tablet 0    carvedilol (COREG) 6.25 MG Tab Take 1 Tablet by mouth 2 times a day for 30 days. 60 Tablet 0    albuterol 108 (90 Base) MCG/ACT Aero Soln inhalation aerosol Inhale.      furosemide (LASIX) 20 MG Tab TAKE 1 TABLET (20 MG) BY MOUTH ONCE DAILY FOR 10 DAYS.      sildenafil citrate (VIAGRA) 100 MG tablet Take 100 mg by mouth.      simethicone (MYLICON) 80 MG Chew Tab Chew 80 mg.      apixaban (ELIQUIS) 5mg Tab Take 5 mg by mouth 2 times a day.      simvastatin (ZOCOR) 40 MG Tab Take 40 mg by mouth every evening.      allopurinol (ZYLOPRIM) 300 MG Tab Take 300 mg by mouth every day.      traZODone (DESYREL) 50 MG Tab Take 50 mg by mouth every evening.      Alprostadil, Vasodilator, 250 MCG PELLET Take by urethral route. (Patient not taking: Reported on 12/12/2024)      loratadine (CLARITIN) 10 MG Tab Take 10 mg by mouth every day. (Patient not taking: Reported on 12/12/2024)      sacubitril-valsartan (ENTRESTO) 24-26 MG Tab Take  by mouth. (Patient not taking: Reported on 12/12/2024)      nitroglycerin (NITRODUR) 0.1 MG/HR PATCH 24 HR Place 1 Patch on the skin every day. (Patient not taking: Reported on 12/12/2024)       No current [facility]-ordered facility-administered medications on file.       No past  "surgical history on file.    Social History     Tobacco Use    Smoking status: Never    Smokeless tobacco: Never   Vaping Use    Vaping status: Never Used   Substance Use Topics    Alcohol use: Yes    Drug use: Never       family history is not on file.     Problem list, medications, and allergies reviewed by myself today in Epic.     Objective:   /78   Pulse 78   Temp 36.9 °C (98.5 °F)   Resp 16   Ht 1.803 m (5' 11\")   Wt 97.5 kg (215 lb)   SpO2 97%   BMI 29.99 kg/m²     Physical Exam  Vitals and nursing note reviewed.   Constitutional:       General: He is not in acute distress.     Appearance: Normal appearance. He is well-developed. He is not ill-appearing, toxic-appearing or diaphoretic.   HENT:      Head: Normocephalic and atraumatic.      Right Ear: Tympanic membrane, ear canal and external ear normal.      Left Ear: Tympanic membrane, ear canal and external ear normal.      Nose: Congestion and rhinorrhea present.      Mouth/Throat:      Mouth: Mucous membranes are moist.      Pharynx: Oropharynx is clear. Postnasal drip present. No oropharyngeal exudate or posterior oropharyngeal erythema.      Comments: Clear speech.  Managing oral secretions.  Eyes:      General:         Right eye: No discharge.         Left eye: No discharge.      Conjunctiva/sclera: Conjunctivae normal.   Cardiovascular:      Rate and Rhythm: Normal rate and regular rhythm.   Pulmonary:      Effort: Pulmonary effort is normal. No respiratory distress.      Breath sounds: Normal breath sounds. No wheezing, rhonchi or rales.      Comments: Diminished right lower lobe.  No obvious rales  Chest:      Chest wall: No tenderness.   Musculoskeletal:         General: No swelling or tenderness.      Cervical back: Normal range of motion and neck supple.      Right lower leg: No edema.      Left lower leg: No edema.   Lymphadenopathy:      Cervical: No cervical adenopathy.   Skin:     General: Skin is warm and dry.   Neurological:     "  General: No focal deficit present.      Mental Status: He is alert and oriented to person, place, and time. Mental status is at baseline.   Psychiatric:         Mood and Affect: Mood normal.         Behavior: Behavior normal.         Thought Content: Thought content normal.         Judgment: Judgment normal.         RADIOLOGY RESULTS   DX-CHEST-2 VIEWS    Result Date: 12/12/2024 12/12/2024 12:09 PM HISTORY/REASON FOR EXAM:  Cough TECHNIQUE/EXAM DESCRIPTION AND NUMBER OF VIEWS: Two views of the chest. COMPARISON:  6/17/2024 FINDINGS: HEART: Not enlarged. LUNGS: RIGHT lower lung opacity is unchanged. PLEURA: There is blunting of the RIGHT costophrenic sulcus, as before     Small RIGHT pleural effusion with overlying atelectasis and possible pneumonia, unchanged Persistently enlarged cardiac silhouette         Lab Results/POC Test Results   Results for orders placed or performed in visit on 12/12/24   POCT CoV-2, Flu A/B, RSV by PCR    Collection Time: 12/12/24  1:32 PM   Result Value Ref Range    SARS-CoV-2 by PCR Negative Negative, Invalid    Influenza virus A RNA Negative Negative, Invalid    Influenza virus B, PCR Negative Negative, Invalid    RSV, PCR Negative Negative, Invalid             Assessment/Plan:     I personally reviewed prior external notes and test results pertinent to today's visit as well as additional imaging and testing completed in clinic today.    I introduced myself as Ilya Carlson a Nurse Practitioner.    1. Acute cough  DX-CHEST-2 VIEWS    POCT CoV-2, Flu A/B, RSV by PCR    doxycycline (VIBRAMYCIN) 100 MG Tab      2. Atrial fibrillation, unspecified type (HCC)  carvedilol (COREG) 6.25 MG Tab      Very pleasant 79-year-old with 2 days of cold-like symptoms also notes a harsh congested cough which reminds him of the last time he had pneumonia.  There is been no fevers.  He does have multiple comorbidities and polypharmacy.  Only using Mucinex.  Thankfully in clinic there is no fever,  normotensive, no hypoxia, no tachycardia.  Does appear nontoxic.  No distress and talking full sentences.  Did note congestion rhinorrhea and postnasal drainage.  Also diminished lung sounds in the right lower lobe but no obvious rales or wheezes throughout.  No significant lower extremity swelling.  Remainder of exam is benign.  We did complete imaging which shows continued right small pleural effusion with some possible atelectasis versus pneumonia.  Did complete viral testing which was negative.  Given his history and x-ray findings we will place on doxycycline twice daily x 7 days.  He is not in acute distress but does need follow-up with cardiology in the next 48 to [facility].  Will refill his carvedilol.  Discussed pulmonary toilet.  Ambulation as tolerated.  Strict ER precautions reviewed at length. Medication discussed included indication for use and the potential benefits and side effects. The Patient was encouraged to monitor symptoms closely, and we reviewed the signs and symptoms that require a higher level of care through the emergency department. Patient verbalized understanding.    Please note that this dictation was created using voice recognition software. I have made every reasonable attempt to correct obvious errors, but I expect that there are errors of grammar and possibly content that I did not discover before finalizing the note.    This note was electronically signed by EBENEZER Kim

## 2025-01-03 DIAGNOSIS — I48.91 ATRIAL FIBRILLATION, UNSPECIFIED TYPE (HCC): ICD-10-CM

## 2025-01-03 RX ORDER — CARVEDILOL 6.25 MG/1
6.25 TABLET ORAL 2 TIMES DAILY
Qty: 180 TABLET | Refills: 1 | Status: SHIPPED | OUTPATIENT
Start: 2025-01-03